# Patient Record
Sex: MALE | Race: WHITE | NOT HISPANIC OR LATINO | URBAN - METROPOLITAN AREA
[De-identification: names, ages, dates, MRNs, and addresses within clinical notes are randomized per-mention and may not be internally consistent; named-entity substitution may affect disease eponyms.]

---

## 2024-01-23 ENCOUNTER — APPOINTMENT (RX ONLY)
Dept: URBAN - METROPOLITAN AREA CLINIC 166 | Facility: CLINIC | Age: 74
Setting detail: DERMATOLOGY
End: 2024-01-23

## 2024-01-23 DIAGNOSIS — L20.89 OTHER ATOPIC DERMATITIS: ICD-10-CM | Status: WELL CONTROLLED

## 2024-01-23 DIAGNOSIS — L57.8 OTHER SKIN CHANGES DUE TO CHRONIC EXPOSURE TO NONIONIZING RADIATION: ICD-10-CM | Status: IMPROVED

## 2024-01-23 PROCEDURE — ? COUNSELING

## 2024-01-23 PROCEDURE — 99214 OFFICE O/P EST MOD 30 MIN: CPT

## 2024-01-23 PROCEDURE — ? DUPIXENT MONITORING

## 2024-01-23 ASSESSMENT — LOCATION ZONE DERM
LOCATION ZONE: TRUNK
LOCATION ZONE: SCALP
LOCATION ZONE: ARM

## 2024-01-23 ASSESSMENT — LOCATION SIMPLE DESCRIPTION DERM
LOCATION SIMPLE: RIGHT UPPER ARM
LOCATION SIMPLE: POSTERIOR SCALP
LOCATION SIMPLE: LEFT UPPER ARM
LOCATION SIMPLE: LOWER BACK

## 2024-01-23 ASSESSMENT — LOCATION DETAILED DESCRIPTION DERM
LOCATION DETAILED: POSTERIOR MID-PARIETAL SCALP
LOCATION DETAILED: LEFT ANTERIOR PROXIMAL UPPER ARM
LOCATION DETAILED: SUPERIOR LUMBAR SPINE
LOCATION DETAILED: RIGHT ANTERIOR DISTAL UPPER ARM

## 2024-01-23 ASSESSMENT — BSA ECZEMA: % BODY COVERED IN ECZEMA: 5

## 2024-01-23 NOTE — PROCEDURE: COUNSELING
Topical Retinoids Recommendations: Pea sized amount of retinoid in evenings (at least 10 mins after washing face) as tolerated
Nicotinamide Supplementation Recommendations: Heliocare Advanced Daily
Detail Level: Zone
Cleanser Recommendations: Gentle fragrance free cleansers
Moisturizer Recommendations: CeraVe, Cetaphil, or Aveeno
Detail Level: Generalized
Antihistamine Recommendations: Allegra (Fexofenadine)
Bleach Bath Recommendations: 1/4 cup plain bleach to a full bathtub of water

## 2024-01-23 NOTE — PROCEDURE: DUPIXENT MONITORING
Detail Level: Generalized
Comments: Wt: Ht:
Add High Risk Medication Management Associated Diagnosis?: Yes

## 2024-07-23 ENCOUNTER — APPOINTMENT (RX ONLY)
Dept: URBAN - METROPOLITAN AREA CLINIC 166 | Facility: CLINIC | Age: 74
Setting detail: DERMATOLOGY
End: 2024-07-23

## 2024-07-23 DIAGNOSIS — B35.1 TINEA UNGUIUM: ICD-10-CM

## 2024-07-23 DIAGNOSIS — L81.4 OTHER MELANIN HYPERPIGMENTATION: ICD-10-CM

## 2024-07-23 DIAGNOSIS — L90.5 SCAR CONDITIONS AND FIBROSIS OF SKIN: ICD-10-CM

## 2024-07-23 DIAGNOSIS — L57.8 OTHER SKIN CHANGES DUE TO CHRONIC EXPOSURE TO NONIONIZING RADIATION: ICD-10-CM

## 2024-07-23 DIAGNOSIS — D18.0 HEMANGIOMA: ICD-10-CM

## 2024-07-23 DIAGNOSIS — L57.0 ACTINIC KERATOSIS: ICD-10-CM

## 2024-07-23 DIAGNOSIS — Z71.89 OTHER SPECIFIED COUNSELING: ICD-10-CM

## 2024-07-23 DIAGNOSIS — L82.1 OTHER SEBORRHEIC KERATOSIS: ICD-10-CM

## 2024-07-23 DIAGNOSIS — L20.89 OTHER ATOPIC DERMATITIS: ICD-10-CM | Status: WELL CONTROLLED

## 2024-07-23 DIAGNOSIS — D22 MELANOCYTIC NEVI: ICD-10-CM

## 2024-07-23 PROBLEM — D22.5 MELANOCYTIC NEVI OF TRUNK: Status: ACTIVE | Noted: 2024-07-23

## 2024-07-23 PROBLEM — D18.01 HEMANGIOMA OF SKIN AND SUBCUTANEOUS TISSUE: Status: ACTIVE | Noted: 2024-07-23

## 2024-07-23 PROCEDURE — 99214 OFFICE O/P EST MOD 30 MIN: CPT | Mod: 25

## 2024-07-23 PROCEDURE — ? INTRALESIONAL KENALOG

## 2024-07-23 PROCEDURE — 17000 DESTRUCT PREMALG LESION: CPT

## 2024-07-23 PROCEDURE — ? LIQUID NITROGEN

## 2024-07-23 PROCEDURE — ? DUPIXENT MONITORING

## 2024-07-23 PROCEDURE — 17003 DESTRUCT PREMALG LES 2-14: CPT

## 2024-07-23 PROCEDURE — 11900 INJECT SKIN LESIONS </W 7: CPT | Mod: 59

## 2024-07-23 PROCEDURE — ? COUNSELING

## 2024-07-23 ASSESSMENT — LOCATION SIMPLE DESCRIPTION DERM
LOCATION SIMPLE: LEFT UPPER ARM
LOCATION SIMPLE: LEFT EAR
LOCATION SIMPLE: SCALP
LOCATION SIMPLE: RIGHT UPPER ARM
LOCATION SIMPLE: RIGHT SHOULDER
LOCATION SIMPLE: RIGHT CHEEK
LOCATION SIMPLE: LEFT EYEBROW
LOCATION SIMPLE: RIGHT GREAT TOE
LOCATION SIMPLE: ABDOMEN
LOCATION SIMPLE: LEFT GREAT TOE
LOCATION SIMPLE: LOWER BACK

## 2024-07-23 ASSESSMENT — LOCATION DETAILED DESCRIPTION DERM
LOCATION DETAILED: LEFT ANTERIOR PROXIMAL UPPER ARM
LOCATION DETAILED: RIGHT GREAT TOENAIL
LOCATION DETAILED: EPIGASTRIC SKIN
LOCATION DETAILED: LEFT SUPERIOR CRUS OF ANTIHELIX
LOCATION DETAILED: LEFT DORSAL GREAT TOE
LOCATION DETAILED: RIGHT ANTERIOR DISTAL UPPER ARM
LOCATION DETAILED: RIGHT SUPERIOR PARIETAL SCALP
LOCATION DETAILED: SUPERIOR LUMBAR SPINE
LOCATION DETAILED: RIGHT POSTERIOR SHOULDER
LOCATION DETAILED: RIGHT INFERIOR CENTRAL MALAR CHEEK
LOCATION DETAILED: RIGHT SUPERIOR LATERAL MALAR CHEEK
LOCATION DETAILED: LEFT CENTRAL EYEBROW

## 2024-07-23 ASSESSMENT — LOCATION ZONE DERM
LOCATION ZONE: TRUNK
LOCATION ZONE: TOENAIL
LOCATION ZONE: TOE
LOCATION ZONE: FACE
LOCATION ZONE: ARM
LOCATION ZONE: EAR
LOCATION ZONE: SCALP
LOCATION ZONE: TRUNK

## 2024-07-23 ASSESSMENT — SEVERITY ASSESSMENT 2020: SEVERITY 2020: CLEAR

## 2024-07-23 ASSESSMENT — ITCH NUMERIC RATING SCALE: HOW SEVERE IS YOUR ITCHING?: 0

## 2024-07-23 ASSESSMENT — BSA ECZEMA: % BODY COVERED IN ECZEMA: 0

## 2024-07-23 NOTE — PROCEDURE: DUPIXENT MONITORING
Detail Level: Generalized
Comments: Wt: 145 Ht:5'8
Add High Risk Medication Management Associated Diagnosis?: Yes

## 2024-07-23 NOTE — PROCEDURE: LIQUID NITROGEN
Show Applicator Variable?: Yes
Consent: The patient's consent was obtained including but not limited to risks of crusting, scabbing, blistering, scarring, darker or lighter pigmentary change, recurrence, incomplete removal and infection.
Number Of Freeze-Thaw Cycles: 2 freeze-thaw cycles
Detail Level: Simple
Post-Care Instructions: I reviewed with the patient in detail post-care instructions. Patient is to wear sunprotection, and avoid picking at any of the treated lesions. Pt may apply Vaseline to crusted or scabbing areas.

## 2024-07-23 NOTE — PROCEDURE: COUNSELING
Detail Level: Generalized
Topical Retinoids Recommendations: Pea sized amount of retinoid in evenings (at least 10 mins after washing face) as tolerated
Nicotinamide Supplementation Recommendations: Heliocare Advanced Daily
Detail Level: Zone
Sunscreen Recommendations: Eryfotona Actinica or Eryfotona Ageless can improve appearance of skin and slow/decrease growth or pre-cancerous lesions
Ipl Recommendations: Consult with 
Bleaching Agents Recommendations: Hydroquinone should be used 3 months on 3 months off\\nIf used continually it can cause pigmentation in the skin that will never resolve
Sunscreen Recommendations: SPF 30+ or higher daily
Chemical Peel Recommendations: VI peel
Skin Checks Recommendations: monthly
Vinegar Soaks Recommendations: white vinegar and original listerine 50/50 mixture soak 10 mins per night
Cleanser Recommendations: Gentle fragrance free cleansers
Moisturizer Recommendations: CeraVe, Cetaphil, or Aveeno
Antihistamine Recommendations: Allegra (Fexofenadine)
Bleach Bath Recommendations: 1/4 cup plain bleach to a full bathtub of water

## 2024-07-23 NOTE — PROCEDURE: INTRALESIONAL KENALOG
How Many Mls Were Removed From The 10 Mg/Ml (5ml) Vial When Preparing The Injectable Solution?: 0
Show Inventory Tab: Hide
Include Z78.9 (Other Specified Conditions Influencing Health Status) As An Associated Diagnosis?: Yes
Require Ndc Code?: No
Kenalog Type Of Vial: Multiple Dose
Kenalog Preparation: Kenalog
Total Volume (Ccs): 0.2
Consent: The risks of atrophy were reviewed with the patient.
Detail Level: Zone
Medical Necessity Clause: This procedure was medically necessary because the lesions that were treated were:
Concentration Of Kenalog Solution Injected (Mg/Ml): 10.0

## 2024-09-23 ENCOUNTER — RX ONLY (OUTPATIENT)
Age: 74
Setting detail: RX ONLY
End: 2024-09-23

## 2024-09-23 RX ORDER — DUPILUMAB 300 MG/2ML
INJECTION, SOLUTION SUBCUTANEOUS
Qty: 2 | Refills: 4 | Status: ERX

## 2024-10-24 ENCOUNTER — APPOINTMENT (RX ONLY)
Dept: URBAN - METROPOLITAN AREA CLINIC 166 | Facility: CLINIC | Age: 74
Setting detail: DERMATOLOGY
End: 2024-10-24

## 2024-10-24 DIAGNOSIS — L57.0 ACTINIC KERATOSIS: ICD-10-CM | Status: INADEQUATELY CONTROLLED

## 2024-10-24 DIAGNOSIS — D22 MELANOCYTIC NEVI: ICD-10-CM

## 2024-10-24 PROBLEM — D22.72 MELANOCYTIC NEVI OF LEFT LOWER LIMB, INCLUDING HIP: Status: ACTIVE | Noted: 2024-10-24

## 2024-10-24 PROBLEM — D22.71 MELANOCYTIC NEVI OF RIGHT LOWER LIMB, INCLUDING HIP: Status: ACTIVE | Noted: 2024-10-24

## 2024-10-24 PROCEDURE — 99214 OFFICE O/P EST MOD 30 MIN: CPT

## 2024-10-24 PROCEDURE — ? COUNSELING

## 2024-10-24 PROCEDURE — ? PRESCRIPTION MEDICATION MANAGEMENT

## 2024-10-24 PROCEDURE — ? PRESCRIPTION

## 2024-10-24 ASSESSMENT — LOCATION SIMPLE DESCRIPTION DERM
LOCATION SIMPLE: RIGHT FOOT
LOCATION SIMPLE: LEFT FOOT

## 2024-10-24 ASSESSMENT — LOCATION ZONE DERM: LOCATION ZONE: FEET

## 2024-10-24 ASSESSMENT — LOCATION DETAILED DESCRIPTION DERM
LOCATION DETAILED: LEFT DORSAL FOOT
LOCATION DETAILED: RIGHT DORSAL FOOT

## 2024-10-24 NOTE — PROCEDURE: PRESCRIPTION MEDICATION MANAGEMENT
Detail Level: Zone
Initiate Treatment: Efudex compound BID 4-7 days to the cheeks
Render In Strict Bullet Format?: No

## 2024-10-24 NOTE — PROCEDURE: COUNSELING
Detail Level: Generalized
Nicotinamide Supplementation Recommendations: Heliocare Advanced
Detail Level: Simple
Sunscreen Recommendations: Eryfotona Actinica or Eryfotona Ageless

## 2025-02-20 ENCOUNTER — APPOINTMENT (OUTPATIENT)
Dept: URBAN - METROPOLITAN AREA CLINIC 166 | Facility: CLINIC | Age: 75
Setting detail: DERMATOLOGY
End: 2025-02-20

## 2025-02-20 DIAGNOSIS — L20.89 OTHER ATOPIC DERMATITIS: ICD-10-CM | Status: WELL CONTROLLED

## 2025-02-20 PROCEDURE — ? PRESCRIPTION MEDICATION MANAGEMENT

## 2025-02-20 PROCEDURE — ? PRESCRIPTION

## 2025-02-20 PROCEDURE — ? DUPIXENT MONITORING

## 2025-02-20 PROCEDURE — ? COUNSELING

## 2025-02-20 PROCEDURE — 99214 OFFICE O/P EST MOD 30 MIN: CPT

## 2025-02-20 RX ORDER — DUPILUMAB 300 MG/2ML
INJECTION, SOLUTION SUBCUTANEOUS
Qty: 6 | Refills: 11 | Status: ERX | COMMUNITY
Start: 2025-02-20

## 2025-02-20 RX ADMIN — DUPILUMAB: 300 INJECTION, SOLUTION SUBCUTANEOUS at 00:00

## 2025-02-20 ASSESSMENT — LOCATION DETAILED DESCRIPTION DERM
LOCATION DETAILED: RIGHT ANTERIOR DISTAL UPPER ARM
LOCATION DETAILED: SUPERIOR LUMBAR SPINE
LOCATION DETAILED: LEFT ANTERIOR PROXIMAL UPPER ARM

## 2025-02-20 ASSESSMENT — BSA ECZEMA: % BODY COVERED IN ECZEMA: 6

## 2025-02-20 ASSESSMENT — LOCATION SIMPLE DESCRIPTION DERM
LOCATION SIMPLE: RIGHT UPPER ARM
LOCATION SIMPLE: LEFT UPPER ARM
LOCATION SIMPLE: LOWER BACK

## 2025-02-20 ASSESSMENT — ITCH NUMERIC RATING SCALE: HOW SEVERE IS YOUR ITCHING?: 0

## 2025-02-20 ASSESSMENT — LOCATION ZONE DERM
LOCATION ZONE: ARM
LOCATION ZONE: TRUNK

## 2025-02-20 NOTE — PROCEDURE: DUPIXENT MONITORING
Detail Level: Generalized
Length Of Therapy: 1.5 years
Comments: Wt: 145 Ht:5'8
Add High Risk Medication Management Associated Diagnosis?: Yes

## 2025-02-20 NOTE — PROCEDURE: COUNSELING
Cleanser Recommendations: Gentle fragrance free cleansers
Moisturizer Recommendations: CeraVe, Cetaphil, or Aveeno
Detail Level: Generalized
Antihistamine Recommendations: Allegra (Fexofenadine)
Bleach Bath Recommendations: 1/4 cup plain bleach to a full bathtub of water

## 2025-02-20 NOTE — HPI: RASH (ATOPIC DERMATITIS)
Is This A New Presentation, Or A Follow-Up?: Follow Up Atopic Dermatitis
Additional History: Pt states well controlled on Dupixent

## 2025-02-27 RX ORDER — DUPILUMAB 300 MG/2ML
INJECTION, SOLUTION SUBCUTANEOUS
Qty: 6 | Refills: 11 | Status: ERX

## 2025-08-20 ENCOUNTER — APPOINTMENT (OUTPATIENT)
Dept: URBAN - METROPOLITAN AREA CLINIC 166 | Facility: CLINIC | Age: 75
Setting detail: DERMATOLOGY
End: 2025-08-20

## 2025-08-20 DIAGNOSIS — L57.8 OTHER SKIN CHANGES DUE TO CHRONIC EXPOSURE TO NONIONIZING RADIATION: ICD-10-CM

## 2025-08-20 DIAGNOSIS — L20.89 OTHER ATOPIC DERMATITIS: ICD-10-CM

## 2025-08-20 DIAGNOSIS — D22 MELANOCYTIC NEVI: ICD-10-CM

## 2025-08-20 DIAGNOSIS — L82.1 OTHER SEBORRHEIC KERATOSIS: ICD-10-CM

## 2025-08-20 DIAGNOSIS — Z71.89 OTHER SPECIFIED COUNSELING: ICD-10-CM

## 2025-08-20 DIAGNOSIS — D49.2 NEOPLASM OF UNSPECIFIED BEHAVIOR OF BONE, SOFT TISSUE, AND SKIN: ICD-10-CM

## 2025-08-20 DIAGNOSIS — D18.0 HEMANGIOMA: ICD-10-CM

## 2025-08-20 DIAGNOSIS — L57.0 ACTINIC KERATOSIS: ICD-10-CM

## 2025-08-20 DIAGNOSIS — L81.4 OTHER MELANIN HYPERPIGMENTATION: ICD-10-CM

## 2025-08-20 PROBLEM — D18.01 HEMANGIOMA OF SKIN AND SUBCUTANEOUS TISSUE: Status: ACTIVE | Noted: 2025-08-20

## 2025-08-20 PROBLEM — D22.5 MELANOCYTIC NEVI OF TRUNK: Status: ACTIVE | Noted: 2025-08-20

## 2025-08-20 PROCEDURE — ? COUNSELING

## 2025-08-20 PROCEDURE — ?: Mod: 25

## 2025-08-20 PROCEDURE — ? ADBRY INITIATION

## 2025-08-20 PROCEDURE — ?

## 2025-08-20 PROCEDURE — ? DUPIXENT MONITORING

## 2025-08-20 PROCEDURE — ? PRESCRIPTION

## 2025-08-20 PROCEDURE — ? LIQUID NITROGEN

## 2025-08-20 PROCEDURE — ?: Mod: 59

## 2025-08-20 PROCEDURE — ? BIOPSY BY SHAVE METHOD

## 2025-08-20 PROCEDURE — ? PRESCRIPTION MEDICATION MANAGEMENT

## 2025-08-20 RX ORDER — TRALOKINUMAB-LDRM 300 MG/2ML
INJECTION, SOLUTION SUBCUTANEOUS
Qty: 4 | Refills: 11 | Status: ERX | COMMUNITY
Start: 2025-08-20

## 2025-08-20 RX ORDER — TRALOKINUMAB-LDRM 300 MG/2ML
INJECTION, SOLUTION SUBCUTANEOUS
Qty: 4 | Refills: 11 | Status: ERX

## 2025-08-20 RX ORDER — TRALOKINUMAB-LDRM 300 MG/2ML
INJECTION, SOLUTION SUBCUTANEOUS
Qty: 4 | Refills: 0 | Status: ERX

## 2025-08-20 RX ADMIN — TRALOKINUMAB-LDRM: 300 INJECTION, SOLUTION SUBCUTANEOUS at 00:00

## 2025-08-20 ASSESSMENT — LOCATION DETAILED DESCRIPTION DERM
LOCATION DETAILED: LEFT ANTERIOR PROXIMAL UPPER ARM
LOCATION DETAILED: SUPERIOR LUMBAR SPINE
LOCATION DETAILED: LEFT SUPERIOR HELIX
LOCATION DETAILED: LEFT INFERIOR LATERAL FOREHEAD
LOCATION DETAILED: LEFT SUPERIOR LATERAL BUCCAL CHEEK
LOCATION DETAILED: RIGHT SUPERIOR MEDIAL UPPER BACK
LOCATION DETAILED: RIGHT ANTERIOR DISTAL UPPER ARM
LOCATION DETAILED: LEFT SUPERIOR LATERAL MALAR CHEEK

## 2025-08-20 ASSESSMENT — LOCATION ZONE DERM
LOCATION ZONE: FACE
LOCATION ZONE: TRUNK
LOCATION ZONE: ARM
LOCATION ZONE: EAR

## 2025-08-20 ASSESSMENT — LOCATION SIMPLE DESCRIPTION DERM
LOCATION SIMPLE: LEFT UPPER ARM
LOCATION SIMPLE: LOWER BACK
LOCATION SIMPLE: LEFT EAR
LOCATION SIMPLE: LEFT FOREHEAD
LOCATION SIMPLE: LEFT CHEEK
LOCATION SIMPLE: RIGHT UPPER ARM
LOCATION SIMPLE: RIGHT UPPER BACK

## 2025-08-20 ASSESSMENT — BSA ECZEMA: % BODY COVERED IN ECZEMA: 10

## 2025-08-20 ASSESSMENT — ITCH NUMERIC RATING SCALE: HOW SEVERE IS YOUR ITCHING?: 4

## 2025-08-20 ASSESSMENT — SEVERITY ASSESSMENT 2020: SEVERITY 2020: MODERATE

## 2025-08-29 ENCOUNTER — APPOINTMENT (OUTPATIENT)
Dept: URBAN - METROPOLITAN AREA CLINIC 164 | Facility: CLINIC | Age: 75
Setting detail: DERMATOLOGY
End: 2025-08-29

## 2025-08-29 PROCEDURE — ? MOHS SURGERY PHONE CONSULTATION
